# Patient Record
Sex: MALE | URBAN - NONMETROPOLITAN AREA
[De-identification: names, ages, dates, MRNs, and addresses within clinical notes are randomized per-mention and may not be internally consistent; named-entity substitution may affect disease eponyms.]

---

## 2020-02-19 ENCOUNTER — DOCTOR'S OFFICE (OUTPATIENT)
Dept: URBAN - NONMETROPOLITAN AREA CLINIC 1 | Facility: CLINIC | Age: 50
Setting detail: OPHTHALMOLOGY
End: 2020-02-19
Payer: COMMERCIAL

## 2020-02-19 DIAGNOSIS — H25.13: ICD-10-CM

## 2020-02-19 PROCEDURE — 99204 OFFICE O/P NEW MOD 45 MIN: CPT | Performed by: OPHTHALMOLOGY

## 2020-02-19 ASSESSMENT — REFRACTION_AUTOREFRACTION
OS_CYLINDER: -0.25
OS_AXIS: 062
OD_AXIS: 153
OD_CYLINDER: -0.50
OS_SPHERE: +0.25
OD_SPHERE: -0.25

## 2020-02-19 ASSESSMENT — CONFRONTATIONAL VISUAL FIELD TEST (CVF)
OD_FINDINGS: FULL
OS_FINDINGS: FULL

## 2020-02-19 ASSESSMENT — SPHEQUIV_DERIVED
OS_SPHEQUIV: 0.125
OD_SPHEQUIV: -0.5

## 2020-02-19 ASSESSMENT — VISUAL ACUITY
OD_BCVA: 20/20-2
OS_BCVA: 20/40+2

## 2020-03-11 ENCOUNTER — DOCTOR'S OFFICE (OUTPATIENT)
Dept: URBAN - NONMETROPOLITAN AREA CLINIC 1 | Facility: CLINIC | Age: 50
Setting detail: OPHTHALMOLOGY
End: 2020-03-11

## 2020-03-11 DIAGNOSIS — H52.223: ICD-10-CM

## 2020-03-11 DIAGNOSIS — H52.4: ICD-10-CM

## 2020-03-11 PROCEDURE — 92015 DETERMINE REFRACTIVE STATE: CPT | Performed by: OPTOMETRIST

## 2020-03-11 ASSESSMENT — REFRACTION_MANIFEST
OS_VA2: 20/25
OD_SPHERE: -0.25
OS_SPHERE: PLANO
OD_ADD: +2.25
OD_VA1: 20/25-2
OD_VA2: 20/25-2
OD_AXIS: 165
OS_AXIS: 030
OS_ADD: +2.25
OD_CYLINDER: -0.25
OS_VA1: 20/25
OS_CYLINDER: -0.25

## 2020-03-11 ASSESSMENT — SPHEQUIV_DERIVED
OD_SPHEQUIV: -0.375
OS_SPHEQUIV: 0
OD_SPHEQUIV: -0.5

## 2020-03-11 ASSESSMENT — VISUAL ACUITY
OS_BCVA: 20/50-2
OD_BCVA: 20/20-2

## 2020-03-11 ASSESSMENT — REFRACTION_AUTOREFRACTION
OD_CYLINDER: -0.50
OS_SPHERE: +0.25
OD_SPHERE: -0.25
OS_AXIS: 033
OD_AXIS: 164
OS_CYLINDER: -0.50

## 2020-03-11 ASSESSMENT — CONFRONTATIONAL VISUAL FIELD TEST (CVF)
OD_FINDINGS: FULL
OS_FINDINGS: FULL

## 2022-02-15 ENCOUNTER — DOCTOR'S OFFICE (OUTPATIENT)
Dept: URBAN - NONMETROPOLITAN AREA CLINIC 1 | Facility: CLINIC | Age: 52
Setting detail: OPHTHALMOLOGY
End: 2022-02-15
Payer: COMMERCIAL

## 2022-02-15 DIAGNOSIS — H35.373: ICD-10-CM

## 2022-02-15 DIAGNOSIS — H35.3121: ICD-10-CM

## 2022-02-15 DIAGNOSIS — H25.13: ICD-10-CM

## 2022-02-15 DIAGNOSIS — H25.013: ICD-10-CM

## 2022-02-15 PROBLEM — H25.11 CATARACT NUCLEAR SCLEROSIS AGE RELATED; RIGHT EYE, LEFT EYE: Status: ACTIVE | Noted: 2020-02-19

## 2022-02-15 PROBLEM — H25.12 CATARACT NUCLEAR SCLEROSIS AGE RELATED; RIGHT EYE, LEFT EYE: Status: ACTIVE | Noted: 2020-02-19

## 2022-02-15 PROBLEM — H52.223 ASTIGMATISM, REGULAR; BOTH EYES: Status: ACTIVE | Noted: 2020-03-11

## 2022-02-15 PROCEDURE — 99214 OFFICE O/P EST MOD 30 MIN: CPT | Performed by: OPHTHALMOLOGY

## 2022-02-15 PROCEDURE — 92134 CPTRZ OPH DX IMG PST SGM RTA: CPT | Performed by: OPHTHALMOLOGY

## 2022-02-15 ASSESSMENT — SPHEQUIV_DERIVED
OD_SPHEQUIV: -0.125
OD_SPHEQUIV: -0.375
OS_SPHEQUIV: -0.5

## 2022-02-15 ASSESSMENT — REFRACTION_MANIFEST
OD_CYLINDER: -0.25
OD_ADD: +2.25
OD_VA2: 20/25-2
OD_VA1: 20/25-2
OS_AXIS: 030
OD_SPHERE: -0.25
OD_AXIS: 165
OS_VA2: 20/25
OS_CYLINDER: -0.25
OS_SPHERE: PLANO
OS_VA1: 20/25
OS_ADD: +2.25

## 2022-02-15 ASSESSMENT — REFRACTION_AUTOREFRACTION
OS_AXIS: 064
OD_AXIS: 163
OD_CYLINDER: -0.75
OS_SPHERE: 0.00
OS_CYLINDER: -1.00
OD_SPHERE: +0.25

## 2022-02-15 ASSESSMENT — CONFRONTATIONAL VISUAL FIELD TEST (CVF)
OS_FINDINGS: FULL
OD_FINDINGS: FULL

## 2022-02-15 ASSESSMENT — VISUAL ACUITY
OS_BCVA: 20/50+1
OD_BCVA: 20/25

## 2022-07-30 ENCOUNTER — APPOINTMENT (EMERGENCY)
Dept: RADIOLOGY | Facility: HOSPITAL | Age: 52
End: 2022-07-30
Payer: COMMERCIAL

## 2022-07-30 ENCOUNTER — HOSPITAL ENCOUNTER (EMERGENCY)
Facility: HOSPITAL | Age: 52
Discharge: HOME | End: 2022-07-30
Attending: EMERGENCY MEDICINE | Admitting: EMERGENCY MEDICINE
Payer: COMMERCIAL

## 2022-07-30 VITALS
TEMPERATURE: 97.2 F | RESPIRATION RATE: 18 BRPM | SYSTOLIC BLOOD PRESSURE: 104 MMHG | HEART RATE: 88 BPM | DIASTOLIC BLOOD PRESSURE: 53 MMHG | WEIGHT: 281.31 LBS | OXYGEN SATURATION: 93 % | BODY MASS INDEX: 45.21 KG/M2 | HEIGHT: 66 IN

## 2022-07-30 DIAGNOSIS — G89.29 CHRONIC PAIN OF LEFT LOWER EXTREMITY: Primary | ICD-10-CM

## 2022-07-30 DIAGNOSIS — M79.605 CHRONIC PAIN OF LEFT LOWER EXTREMITY: Primary | ICD-10-CM

## 2022-07-30 LAB
ANION GAP SERPL CALCULATED.3IONS-SCNC: 12 MMOL/L (ref 4–13)
APTT PPP: 25 SECONDS (ref 23–37)
BASOPHILS # BLD AUTO: 0.03 THOUSANDS/ΜL (ref 0–0.1)
BASOPHILS NFR BLD AUTO: 0 % (ref 0–1)
BUN SERPL-MCNC: 22 MG/DL (ref 5–25)
CALCIUM SERPL-MCNC: 9 MG/DL (ref 8.3–10.1)
CARDIAC TROPONIN I PNL SERPL HS: 4 NG/L
CHLORIDE SERPL-SCNC: 100 MMOL/L (ref 96–108)
CO2 SERPL-SCNC: 26 MMOL/L (ref 21–32)
CREAT SERPL-MCNC: 1.17 MG/DL (ref 0.6–1.3)
D DIMER PPP FEU-MCNC: 0.38 UG/ML FEU
EOSINOPHIL # BLD AUTO: 0.3 THOUSAND/ΜL (ref 0–0.61)
EOSINOPHIL NFR BLD AUTO: 3 % (ref 0–6)
ERYTHROCYTE [DISTWIDTH] IN BLOOD BY AUTOMATED COUNT: 12.6 % (ref 11.6–15.1)
GFR SERPL CREATININE-BSD FRML MDRD: 71 ML/MIN/1.73SQ M
GLUCOSE SERPL-MCNC: 101 MG/DL (ref 65–140)
HCT VFR BLD AUTO: 49.6 % (ref 36.5–49.3)
HGB BLD-MCNC: 17.1 G/DL (ref 12–17)
IMM GRANULOCYTES # BLD AUTO: 0.04 THOUSAND/UL (ref 0–0.2)
IMM GRANULOCYTES NFR BLD AUTO: 0 % (ref 0–2)
INR PPP: 0.97 (ref 0.84–1.19)
LACTATE SERPL-SCNC: 1 MMOL/L (ref 0.5–2)
LYMPHOCYTES # BLD AUTO: 2.47 THOUSANDS/ΜL (ref 0.6–4.47)
LYMPHOCYTES NFR BLD AUTO: 27 % (ref 14–44)
MCH RBC QN AUTO: 31 PG (ref 26.8–34.3)
MCHC RBC AUTO-ENTMCNC: 34.5 G/DL (ref 31.4–37.4)
MCV RBC AUTO: 90 FL (ref 82–98)
MONOCYTES # BLD AUTO: 0.72 THOUSAND/ΜL (ref 0.17–1.22)
MONOCYTES NFR BLD AUTO: 8 % (ref 4–12)
NEUTROPHILS # BLD AUTO: 5.51 THOUSANDS/ΜL (ref 1.85–7.62)
NEUTS SEG NFR BLD AUTO: 62 % (ref 43–75)
NRBC BLD AUTO-RTO: 0 /100 WBCS
NT-PROBNP SERPL-MCNC: 32 PG/ML
PLATELET # BLD AUTO: 269 THOUSANDS/UL (ref 149–390)
PMV BLD AUTO: 9.2 FL (ref 8.9–12.7)
POTASSIUM SERPL-SCNC: 3.1 MMOL/L (ref 3.5–5.3)
PROTHROMBIN TIME: 13 SECONDS (ref 11.6–14.5)
RBC # BLD AUTO: 5.52 MILLION/UL (ref 3.88–5.62)
SODIUM SERPL-SCNC: 138 MMOL/L (ref 135–147)
WBC # BLD AUTO: 9.07 THOUSAND/UL (ref 4.31–10.16)

## 2022-07-30 PROCEDURE — 73590 X-RAY EXAM OF LOWER LEG: CPT

## 2022-07-30 PROCEDURE — 96361 HYDRATE IV INFUSION ADD-ON: CPT

## 2022-07-30 PROCEDURE — 93005 ELECTROCARDIOGRAM TRACING: CPT

## 2022-07-30 PROCEDURE — 83605 ASSAY OF LACTIC ACID: CPT | Performed by: EMERGENCY MEDICINE

## 2022-07-30 PROCEDURE — 83880 ASSAY OF NATRIURETIC PEPTIDE: CPT | Performed by: EMERGENCY MEDICINE

## 2022-07-30 PROCEDURE — 85730 THROMBOPLASTIN TIME PARTIAL: CPT | Performed by: EMERGENCY MEDICINE

## 2022-07-30 PROCEDURE — 84484 ASSAY OF TROPONIN QUANT: CPT | Performed by: EMERGENCY MEDICINE

## 2022-07-30 PROCEDURE — 36415 COLL VENOUS BLD VENIPUNCTURE: CPT | Performed by: EMERGENCY MEDICINE

## 2022-07-30 PROCEDURE — 73502 X-RAY EXAM HIP UNI 2-3 VIEWS: CPT

## 2022-07-30 PROCEDURE — 73562 X-RAY EXAM OF KNEE 3: CPT

## 2022-07-30 PROCEDURE — 73552 X-RAY EXAM OF FEMUR 2/>: CPT

## 2022-07-30 PROCEDURE — 99285 EMERGENCY DEPT VISIT HI MDM: CPT | Performed by: EMERGENCY MEDICINE

## 2022-07-30 PROCEDURE — 99284 EMERGENCY DEPT VISIT MOD MDM: CPT

## 2022-07-30 PROCEDURE — 96374 THER/PROPH/DIAG INJ IV PUSH: CPT

## 2022-07-30 PROCEDURE — 85379 FIBRIN DEGRADATION QUANT: CPT | Performed by: EMERGENCY MEDICINE

## 2022-07-30 PROCEDURE — 80048 BASIC METABOLIC PNL TOTAL CA: CPT | Performed by: EMERGENCY MEDICINE

## 2022-07-30 PROCEDURE — 85025 COMPLETE CBC W/AUTO DIFF WBC: CPT | Performed by: EMERGENCY MEDICINE

## 2022-07-30 PROCEDURE — 85610 PROTHROMBIN TIME: CPT | Performed by: EMERGENCY MEDICINE

## 2022-07-30 RX ORDER — KETOROLAC TROMETHAMINE 30 MG/ML
10 INJECTION, SOLUTION INTRAMUSCULAR; INTRAVENOUS ONCE
Status: COMPLETED | OUTPATIENT
Start: 2022-07-30 | End: 2022-07-30

## 2022-07-30 RX ORDER — HYDROCHLOROTHIAZIDE 12.5 MG/1
1 TABLET ORAL 2 TIMES DAILY
COMMUNITY
Start: 2022-07-18

## 2022-07-30 RX ORDER — LOSARTAN POTASSIUM 100 MG/1
1 TABLET ORAL DAILY
COMMUNITY
Start: 2022-07-25

## 2022-07-30 RX ORDER — MORPHINE SULFATE 15 MG/1
15 TABLET ORAL EVERY 8 HOURS PRN
Qty: 4 TABLET | Refills: 0 | Status: SHIPPED | OUTPATIENT
Start: 2022-07-30

## 2022-07-30 RX ORDER — MELOXICAM 7.5 MG/1
7.5 TABLET ORAL DAILY
COMMUNITY
Start: 2022-07-29 | End: 2023-07-29

## 2022-07-30 RX ORDER — POTASSIUM CHLORIDE 20 MEQ/1
40 TABLET, EXTENDED RELEASE ORAL ONCE
Status: COMPLETED | OUTPATIENT
Start: 2022-07-30 | End: 2022-07-30

## 2022-07-30 RX ADMIN — SODIUM CHLORIDE 500 ML: 0.9 INJECTION, SOLUTION INTRAVENOUS at 09:29

## 2022-07-30 RX ADMIN — POTASSIUM CHLORIDE 40 MEQ: 1500 TABLET, EXTENDED RELEASE ORAL at 11:03

## 2022-07-30 RX ADMIN — KETOROLAC TROMETHAMINE 9.9 MG: 30 INJECTION, SOLUTION INTRAMUSCULAR at 09:34
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
## 2022-08-01 LAB
ATRIAL RATE: 104 BPM
P AXIS: 43 DEGREES
PR INTERVAL: 144 MS
QRS AXIS: 69 DEGREES
QRSD INTERVAL: 84 MS
QT INTERVAL: 346 MS
QTC INTERVAL: 454 MS
T WAVE AXIS: -11 DEGREES
VENTRICULAR RATE: 104 BPM

## 2022-08-01 PROCEDURE — 93010 ELECTROCARDIOGRAM REPORT: CPT | Performed by: INTERNAL MEDICINE

## 2022-11-21 ENCOUNTER — EVALUATION (OUTPATIENT)
Dept: PHYSICAL THERAPY | Facility: CLINIC | Age: 52
End: 2022-11-21

## 2022-11-21 DIAGNOSIS — M51.27 HERNIATION OF INTERVERTEBRAL DISC BETWEEN L5 AND S1: ICD-10-CM

## 2022-11-21 DIAGNOSIS — M51.26 HERNIATION OF INTERVERTEBRAL DISC BETWEEN L4 AND L5: Primary | ICD-10-CM

## 2022-11-21 NOTE — PROGRESS NOTES
PT Evaluation     Today's date: 2022  Patient name: Jose Meyers  : 1970  MRN: [id number]  Referring provider: Cindy Matta MD  Dx:   Encounter Diagnosis     ICD-10-CM    1  Herniation of intervertebral disc between L4 and L5  M51 26       2  Herniation of intervertebral disc between L5 and S1  M51 27                      Assessment  Assessment details: Pt is a 46year old male who presents to OP PT for intervertebral disc herniation of L4-L5 and L5-S1  He reports pain into his L lower leg about 3 months ago, progressing to his entire LE  As of today, he notes some improvements in his pain and states that his pain is present in his L upper leg to his knee  Upon examination, patient presents with (+) L YENNY, WFL lumbar ROM, WFL LE strength, and no directional preference  Due to his current impairments patient has difficulty with prolonged sitting and performing work related activities  Pt would benefit from OP PT services in order to address current impairments and functional limitations  Thank you for your referral!    Impairments: abnormal gait, abnormal or restricted ROM, activity intolerance, impaired physical strength, lacks appropriate home exercise program and pain with function    Goals  STG (to be met within 4 weeks):  1  Pt will reports no more than 3/10 pain at worst in order to improve function   2  Pt will improve lumbar ROM to next least restrictive motion in order to improve lumbar mobility  3  Pt will be able to tolerate sitting for at least 30 minutes in order to complete basic work related tasks  4  Pt will be able to ambulate work related distances without increase in pain in order to improve function  5  Pt will improve FOTO score by at least 10 points in order to improve QOL    LTG (to be met in 8 weeks):  1  Pt will report no more than 0/10 pain at worst in order to complete ADLs  2  Pt will be able to sit for self selected periods of time in order to improve function  3  Pt will be able to ambulate self selected distances in order to meet personal goals  4  Pt will to meet FOTO discharge score in order to improve QOL  5  Pt to be independent with HEP    Plan  Patient would benefit from: skilled physical therapy  Planned modality interventions: unattended electrical stimulation, thermotherapy: hydrocollator packs and cryotherapy  Planned therapy interventions: joint mobilization, manual therapy, neuromuscular re-education, patient education, strengthening, stretching, therapeutic exercise, home exercise program and balance  Frequency: 1x week  Duration in weeks: 6  Treatment plan discussed with: patient        Subjective Evaluation    History of Present Illness  Mechanism of injury: Pt notes lumbar spine pain for the past 3 months ago  He reports it started as pain in his L lower leg at night, then continued throughout his whole LLE  He was seen in ED and seen by PCP, told it was from his back  Standing or walking improves his pain, sitting is when he gets most of his pain  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  Overall pt notes that his pain is starting to get a little better    Pain  Current pain ratin  At best pain ratin  At worst pain ratin  Location: L anterior thigh  Quality: sharp  Aggravating factors: sitting    Treatments  Current treatment: physical therapy  Patient Goals  Patient goals for therapy: increased strength, independence with ADLs/IADLs, increased motion, improved balance and decreased pain          Objective     Concurrent Complaints  Negative for bladder dysfunction, bowel dysfunction and saddle (S4) numbness    Palpation     Additional Palpation Details  (-) tenderness    Tenderness     Additional Tenderness Details  No tenderness noted    Neurological Testing     Sensation     Lumbar   Left   Intact: light touch    Right   Intact: light touch    Reflexes   Left   Patellar (L4): normal (2+)  Achilles (S1): normal (2+)    Right   Patellar (L4): normal (2+)  Achilles (S1): normal (2+)    Active Range of Motion     Lumbar   Flexion:  WFL  Extension:  Restriction level: moderate  Left lateral flexion:  WFL  Right lateral flexion:  [facility]    Joint Play   Joints within functional limits: L2, L3, L4, L5 and S1   Mechanical Assessment    Cervical      Thoracic      Lumbar    Standing flexion: repeated movements   Pain location:no change  Standing extension: repeated movements  Pain location: no change    Strength/Myotome Testing     Left Hip   Planes of Motion   Flexion: 4-  Abduction: 4-    Right Hip   Planes of Motion   Flexion: 4  Abduction: 4-    Left Knee   Flexion: 4  Extension: 4    Right Knee   Flexion: 4  Extension: 4    Tests     Lumbar     Left   Negative crossed SLR, passive SLR, sural bias and tibial bias  Right   Negative crossed SLR and passive SLR  Left Hip   Positive YENNY  Right Hip   Negative YENNY  Left Knee   Negative peroneal nerve tension         Flowsheet Rows    Flowsheet Row Most Recent Value   PT/OT G-Codes    Current Score 44   Projected Score 64             Precautions: HTN    Manuals 11/21       LE HS, quad, piriformis        Long axis distraction                        Neuro Re-Ed        Prone hip /        Palloff press                        TherEx        TM        Standing lumbar /        PPT        LTR        Selam Dunn                        Instructed HEP & education 10'                       Modalities         MHP/CP PRN

## 2022-11-21 NOTE — LETTER
2022    Tari Hernandez MD  [facility]  [facility] [id number]    Patient: Wilford Kehr   YOB: 1970   Date of Visit: 2022     Encounter Diagnosis     ICD-10-CM    1  Herniation of intervertebral disc between L4 and L5  M51 26       2  Herniation of intervertebral disc between L5 and S1  M51 27           Dear Dr Susanna Headley:    Thank you for your recent referral of Wilford Kehr  Please review the attached evaluation summary from Sonny's recent visit  Please verify that you agree with the plan of care by signing the attached order  If you have any questions or concerns, please do not hesitate to call  I sincerely appreciate the opportunity to share in the care of one of your patients and hope to have another opportunity to work with you in the near future  Sincerely,    Nereida Basurto PT      Referring Provider:      I certify that I have read the below Plan of Care and certify the need for these services furnished under this plan of treatment while under my care  Tari Hernandez MD  [facility]  [address] [place] [address]  Via Fax: [phone]          PT Evaluation     Today's date: 2022  Patient name: Wilford Kehr  : 1970  MRN: [id number]  Referring provider: Kathie Coello MD  Dx:   Encounter Diagnosis     ICD-10-CM    1  Herniation of intervertebral disc between L4 and L5  M51 26       2  Herniation of intervertebral disc between L5 and S1  M51 27                      Assessment  Assessment details: Pt is a 46year old male who presents to OP PT for intervertebral disc herniation of L4-L5 and L5-S1  He reports pain into his L lower leg about 3 months ago, progressing to his entire LE  As of today, he notes some improvements in his pain and states that his pain is present in his L upper leg to his knee   Upon examination, patient presents with (+) L YENNY, WFL lumbar ROM, WFL LE strength, and no directional preference  Due to his current impairments patient has difficulty with prolonged sitting and performing work related activities  Pt would benefit from OP PT services in order to address current impairments and functional limitations  Thank you for your referral!    Impairments: abnormal gait, abnormal or restricted ROM, activity intolerance, impaired physical strength, lacks appropriate home exercise program and pain with function    Goals  STG (to be met within 4 weeks):  1  Pt will reports no more than 3/10 pain at worst in order to improve function   2  Pt will improve lumbar ROM to next least restrictive motion in order to improve lumbar mobility  3  Pt will be able to tolerate sitting for at least 30 minutes in order to complete basic work related tasks  4  Pt will be able to ambulate work related distances without increase in pain in order to improve function  5  Pt will improve FOTO score by at least 10 points in order to improve QOL    LTG (to be met in 8 weeks):  1  Pt will report no more than 0/10 pain at worst in order to complete ADLs  2  Pt will be able to sit for self selected periods of time in order to improve function  3  Pt will be able to ambulate self selected distances in order to meet personal goals  4  Pt will to meet FOTO discharge score in order to improve QOL  5  Pt to be independent with HEP    Plan  Patient would benefit from: skilled physical therapy  Planned modality interventions: unattended electrical stimulation, thermotherapy: hydrocollator packs and cryotherapy  Planned therapy interventions: joint mobilization, manual therapy, neuromuscular re-education, patient education, strengthening, stretching, therapeutic exercise, home exercise program and balance  Frequency: 1x week  Duration in weeks: 6  Treatment plan discussed with: patient        Subjective Evaluation    History of Present Illness  Mechanism of injury: Pt notes lumbar spine pain for the past 3 months ago  He reports it started as pain in his L lower leg at night, then continued throughout his whole LLE  He was seen in ED and seen by PCP, told it was from his back  Standing or walking improves his pain, sitting is when he gets most of his pain  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  Overall pt notes that his pain is starting to get a little better    Pain  Current pain ratin  At best pain ratin  At worst pain ratin  Location: L anterior thigh  Quality: sharp  Aggravating factors: sitting    Treatments  Current treatment: physical therapy  Patient Goals  Patient goals for therapy: increased strength, independence with ADLs/IADLs, increased motion, improved balance and decreased pain          Objective     Concurrent Complaints  Negative for bladder dysfunction, bowel dysfunction and saddle (S4) numbness    Palpation     Additional Palpation Details  (-) tenderness    Tenderness     Additional Tenderness Details  No tenderness noted    Neurological Testing     Sensation     Lumbar   Left   Intact: light touch    Right   Intact: light touch    Reflexes   Left   Patellar (L4): normal (2+)  Achilles (S1): normal (2+)    Right   Patellar (L4): normal (2+)  Achilles (S1): normal (2+)    Active Range of Motion     Lumbar   Flexion:  WFL  Extension:  Restriction level: moderate  Left lateral flexion:  WFL  Right lateral flexion:  [facility]    Joint Play   Joints within functional limits: L2, L3, L4, L5 and S1   Mechanical Assessment    Cervical      Thoracic      Lumbar    Standing flexion: repeated movements   Pain location:no change  Standing extension: repeated movements  Pain location: no change    Strength/Myotome Testing     Left Hip   Planes of Motion   Flexion: 4-  Abduction: 4-    Right Hip   Planes of Motion   Flexion: 4  Abduction: 4-    Left Knee   Flexion: 4  Extension: 4    Right Knee   Flexion: 4  Extension: 4    Tests     Lumbar     Left   Negative crossed SLR, passive SLR, sural bias and tibial bias  Right   Negative crossed SLR and passive SLR  Left Hip   Positive YENNY  Right Hip   Negative YENNY  Left Knee   Negative peroneal nerve tension         Flowsheet Rows    Flowsheet Row Most Recent Value   PT/OT G-Codes    Current Score 44   Projected Score 64            Precautions: HTN    Manuals 11/21       LE HS, quad, piriformis        Long axis distraction                        Neuro Re-Ed        Prone hip /        Palloff press                        TherEx        TM        Standing lumbar /        PPT        LTR        Bridges        Dante Dunn                        Instructed HEP & education 10'                       Modalities         MHP/CP PRN

## 2022-11-28 ENCOUNTER — OFFICE VISIT (OUTPATIENT)
Dept: PHYSICAL THERAPY | Facility: CLINIC | Age: 52
End: 2022-11-28

## 2022-11-28 DIAGNOSIS — M51.26 HERNIATION OF INTERVERTEBRAL DISC BETWEEN L4 AND L5: Primary | ICD-10-CM

## 2022-11-28 DIAGNOSIS — M51.27 HERNIATION OF INTERVERTEBRAL DISC BETWEEN L5 AND S1: ICD-10-CM

## 2022-11-28 NOTE — PROGRESS NOTES
Daily Note     Today's date: 2022  Patient name: Jose Meyers  : 1970  MRN: [id number]  Referring provider: Cindy Matta MD  Dx:   Encounter Diagnosis     ICD-10-CM    1  Herniation of intervertebral disc between L4 and L5  M51 26       2  Herniation of intervertebral disc between L5 and S1  M51 27                      Subjective: Patient reports feeling some relief of L hip tightness post tx and therapeutic ex today  "The pain in my L hip is gone since you stretched my leg "      Objective: See treatment diary below      Assessment: Tolerated treatment well  Patient exhibited good technique with therapeutic exercises and would benefit from continued PT to increase ROM/strength and endurance to improve mobility  Plan: Continue per plan of care        Precautions: HTN    Manuals       LE HS, quad, piriformis  10'      Long axis distraction  5'                      Neuro Re-Ed        Prone hip /  10x bilat      Palloff press                        TherEx        TM  10'      Standing lumbar /  10x5"      PPT  15x5"      LTR        Bridges  2x10      Clamshells  2x10      Reverse Clamshells  2x10      Lunges                        Instructed HEP & education 10'                       Modalities         MHP/CP PRN

## 2024-12-22 ENCOUNTER — HOSPITAL ENCOUNTER (EMERGENCY)
Facility: HOSPITAL | Age: 54
Discharge: HOME | End: 2024-12-23
Attending: EMERGENCY MEDICINE
Payer: COMMERCIAL

## 2024-12-22 ENCOUNTER — APPOINTMENT (EMERGENCY)
Dept: RADIOLOGY | Facility: HOSPITAL | Age: 54
End: 2024-12-22
Payer: COMMERCIAL

## 2024-12-22 DIAGNOSIS — J11.1 INFLUENZA: ICD-10-CM

## 2024-12-22 DIAGNOSIS — J18.9 PNEUMONIA: Primary | ICD-10-CM

## 2024-12-22 LAB
ALBUMIN SERPL BCG-MCNC: 4.1 G/DL (ref 3.5–5)
ALP SERPL-CCNC: 79 U/L (ref 34–104)
ALT SERPL W P-5'-P-CCNC: 44 U/L (ref 7–52)
ANION GAP SERPL CALCULATED.3IONS-SCNC: 9 MMOL/L (ref 4–13)
AST SERPL W P-5'-P-CCNC: 25 U/L (ref 13–39)
BASOPHILS # BLD AUTO: 0.04 THOUSANDS/ÂΜL (ref 0–0.1)
BASOPHILS NFR BLD AUTO: 0 % (ref 0–1)
BILIRUB SERPL-MCNC: 0.74 MG/DL (ref 0.2–1)
BNP SERPL-MCNC: 26 PG/ML (ref 0–100)
BUN SERPL-MCNC: 19 MG/DL (ref 5–25)
CALCIUM SERPL-MCNC: 9.1 MG/DL (ref 8.4–10.2)
CHLORIDE SERPL-SCNC: 105 MMOL/L (ref 96–108)
CO2 SERPL-SCNC: 27 MMOL/L (ref 21–32)
CREAT SERPL-MCNC: 1.11 MG/DL (ref 0.6–1.3)
EOSINOPHIL # BLD AUTO: 0.34 THOUSAND/ÂΜL (ref 0–0.61)
EOSINOPHIL NFR BLD AUTO: 4 % (ref 0–6)
ERYTHROCYTE [DISTWIDTH] IN BLOOD BY AUTOMATED COUNT: 12.5 % (ref 11.6–15.1)
FLUAV AG UPPER RESP QL IA.RAPID: NEGATIVE
FLUBV AG UPPER RESP QL IA.RAPID: POSITIVE
GFR SERPL CREATININE-BSD FRML MDRD: 74 ML/MIN/1.73SQ M
GLUCOSE SERPL-MCNC: 94 MG/DL (ref 65–140)
HCT VFR BLD AUTO: 48.2 % (ref 36.5–49.3)
HGB BLD-MCNC: 16.2 G/DL (ref 12–17)
IMM GRANULOCYTES # BLD AUTO: 0.03 THOUSAND/UL (ref 0–0.2)
IMM GRANULOCYTES NFR BLD AUTO: 0 % (ref 0–2)
LYMPHOCYTES # BLD AUTO: 3.79 THOUSANDS/ÂΜL (ref 0.6–4.47)
LYMPHOCYTES NFR BLD AUTO: 40 % (ref 14–44)
MCH RBC QN AUTO: 31 PG (ref 26.8–34.3)
MCHC RBC AUTO-ENTMCNC: 33.6 G/DL (ref 31.4–37.4)
MCV RBC AUTO: 92 FL (ref 82–98)
MONOCYTES # BLD AUTO: 0.9 THOUSAND/ÂΜL (ref 0.17–1.22)
MONOCYTES NFR BLD AUTO: 9 % (ref 4–12)
NEUTROPHILS # BLD AUTO: 4.44 THOUSANDS/ÂΜL (ref 1.85–7.62)
NEUTS SEG NFR BLD AUTO: 47 % (ref 43–75)
NRBC BLD AUTO-RTO: 0 /100 WBCS
PLATELET # BLD AUTO: 272 THOUSANDS/UL (ref 149–390)
PMV BLD AUTO: 9.6 FL (ref 8.9–12.7)
POTASSIUM SERPL-SCNC: 3.8 MMOL/L (ref 3.5–5.3)
PROCALCITONIN SERPL-MCNC: <0.05 NG/ML
PROT SERPL-MCNC: 7.5 G/DL (ref 6.4–8.4)
RBC # BLD AUTO: 5.22 MILLION/UL (ref 3.88–5.62)
SARS-COV+SARS-COV-2 AG RESP QL IA.RAPID: NEGATIVE
SODIUM SERPL-SCNC: 141 MMOL/L (ref 135–147)
WBC # BLD AUTO: 9.54 THOUSAND/UL (ref 4.31–10.16)

## 2024-12-22 PROCEDURE — 84145 PROCALCITONIN (PCT): CPT | Performed by: EMERGENCY MEDICINE

## 2024-12-22 PROCEDURE — 71046 X-RAY EXAM CHEST 2 VIEWS: CPT

## 2024-12-22 PROCEDURE — 99285 EMERGENCY DEPT VISIT HI MDM: CPT

## 2024-12-22 PROCEDURE — 85025 COMPLETE CBC W/AUTO DIFF WBC: CPT | Performed by: EMERGENCY MEDICINE

## 2024-12-22 PROCEDURE — 93005 ELECTROCARDIOGRAM TRACING: CPT

## 2024-12-22 PROCEDURE — 87811 SARS-COV-2 COVID19 W/OPTIC: CPT | Performed by: EMERGENCY MEDICINE

## 2024-12-22 PROCEDURE — 80053 COMPREHEN METABOLIC PANEL: CPT | Performed by: EMERGENCY MEDICINE

## 2024-12-22 PROCEDURE — 83880 ASSAY OF NATRIURETIC PEPTIDE: CPT | Performed by: EMERGENCY MEDICINE

## 2024-12-22 PROCEDURE — 87804 INFLUENZA ASSAY W/OPTIC: CPT | Performed by: EMERGENCY MEDICINE

## 2024-12-22 PROCEDURE — 36415 COLL VENOUS BLD VENIPUNCTURE: CPT | Performed by: EMERGENCY MEDICINE

## 2024-12-23 VITALS
TEMPERATURE: 98.1 F | RESPIRATION RATE: 20 BRPM | OXYGEN SATURATION: 94 % | DIASTOLIC BLOOD PRESSURE: 64 MMHG | HEART RATE: 81 BPM | SYSTOLIC BLOOD PRESSURE: 124 MMHG

## 2024-12-23 LAB
QRS AXIS: 12 DEGREES
QRSD INTERVAL: 80 MS
QT INTERVAL: 376 MS
QTC INTERVAL: 428 MS
T WAVE AXIS: 8 DEGREES
VENTRICULAR RATE: 78 BPM

## 2024-12-23 PROCEDURE — 99285 EMERGENCY DEPT VISIT HI MDM: CPT | Performed by: EMERGENCY MEDICINE

## 2024-12-23 PROCEDURE — 96374 THER/PROPH/DIAG INJ IV PUSH: CPT

## 2024-12-23 RX ORDER — OSELTAMIVIR PHOSPHATE 75 MG/1
75 CAPSULE ORAL 2 TIMES DAILY
Qty: 10 CAPSULE | Refills: 0 | Status: SHIPPED | OUTPATIENT
Start: 2024-12-23 | End: 2024-12-28

## 2024-12-23 RX ORDER — DEXAMETHASONE SODIUM PHOSPHATE 10 MG/ML
8 INJECTION, SOLUTION INTRAMUSCULAR; INTRAVENOUS ONCE
Status: COMPLETED | OUTPATIENT
Start: 2024-12-23 | End: 2024-12-23

## 2024-12-23 RX ORDER — OSELTAMIVIR PHOSPHATE 75 MG/1
75 CAPSULE ORAL ONCE
Status: COMPLETED | OUTPATIENT
Start: 2024-12-23 | End: 2024-12-23

## 2024-12-23 RX ORDER — PREDNISONE 20 MG/1
40 TABLET ORAL DAILY
Qty: 8 TABLET | Refills: 0 | Status: SHIPPED | OUTPATIENT
Start: 2024-12-24 | End: 2024-12-28

## 2024-12-23 RX ORDER — DOXYCYCLINE 100 MG/1
100 CAPSULE ORAL EVERY 12 HOURS SCHEDULED
Qty: 14 CAPSULE | Refills: 0 | Status: SHIPPED | OUTPATIENT
Start: 2024-12-23 | End: 2024-12-30

## 2024-12-23 RX ORDER — DOXYCYCLINE 100 MG/1
100 CAPSULE ORAL ONCE
Status: COMPLETED | OUTPATIENT
Start: 2024-12-23 | End: 2024-12-23

## 2024-12-23 RX ADMIN — OSELTAMAVIR PHOSPHATE 75 MG: 75 CAPSULE ORAL at 00:34

## 2024-12-23 RX ADMIN — DOXYCYCLINE 100 MG: 100 CAPSULE ORAL at 00:34

## 2024-12-23 RX ADMIN — DEXAMETHASONE SODIUM PHOSPHATE 8 MG: 10 INJECTION, SOLUTION INTRAMUSCULAR; INTRAVENOUS at 00:34

## 2024-12-23 NOTE — ED PROVIDER NOTES
Time reflects when diagnosis was documented in both MDM as applicable and the Disposition within this note       Time User Action Codes Description Comment    12/23/2024 12:28 AM Yun Suarez [J18.9] Pneumonia     12/23/2024 12:28 AM Yun Suarez [J11.1] Influenza           ED Disposition       ED Disposition   Discharge    Condition   Stable    Date/Time   Mon Dec 23, 2024 12:28 AM    Comment   Sonny Mckeon discharge to home/self care.                   Assessment & Plan       Medical Decision Making  Differential diagnosis includes but not limited to: Pneumonia, bronchitis, viral syndrome, COPD exacerbation, CHF exacerbation.    Amount and/or Complexity of Data Reviewed  Labs: ordered.  Radiology: ordered and independent interpretation performed.    Risk  Prescription drug management.        ED Course as of 12/23/24 0052   Mon Dec 23, 2024   0046 Patient stable during the ED course.  Found to be influenza positive.  Oxygenation on room air 95%.  Did ambulatory trial with pulse ox and patient did not have any decrease in his oxygenation and states that he felt improved.  Chest x-ray with questionable infiltrate.  Patient given Tamiflu as well as doxycycline and steroids.  Advised to return to the ER if symptoms worsen in any way.  I did strongly advised the patient to follow-up with his PCP regarding his cessation of his diuretic.  He agrees to comply.       Medications   oseltamivir (TAMIFLU) capsule 75 mg (75 mg Oral Given 12/23/24 0034)   doxycycline hyclate (VIBRAMYCIN) capsule 100 mg (100 mg Oral Given 12/23/24 0034)   dexamethasone (PF) (DECADRON) injection 8 mg (8 mg Intravenous Given 12/23/24 0034)       ED Risk Strat Scores                                              History of Present Illness       Chief Complaint   Patient presents with    Shortness of Breath     Pt was laying flat in bed and felt short of breath. Pt has no other complaints and feels fine during triage.        Past  Medical History:   Diagnosis Date    Arthritis     Hypertension       History reviewed. No pertinent surgical history.   History reviewed. No pertinent family history.   Social History     Tobacco Use    Smoking status: Never    Smokeless tobacco: Never   Vaping Use    Vaping status: Never Used   Substance Use Topics    Alcohol use: Not Currently    Drug use: Not Currently      E-Cigarette/Vaping    E-Cigarette Use Never User       E-Cigarette/Vaping Substances      I have reviewed and agree with the history as documented.     54-year-old male presenting to the ED for evaluation of 1 episode of shortness of breath.  Patient states that he was laying in bed when he began to feel short of breath.  He denies any chest pain and states that his symptoms improved upon arrival to the ED.  He denies any URI symptoms, recent travel or sick contacts.  Patient does admit that he was previously prescribed a water pill but stopped taking it secondary to his job.  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  He states that he has had no problems with his breathing or edema since stopping the medicine.        Review of Systems   Constitutional:  Negative for chills and fever.   HENT:  Negative for ear pain and sore throat.    Eyes:  Negative for pain and visual disturbance.   Respiratory:  Positive for shortness of breath. Negative for apnea, cough and choking.    Cardiovascular: Negative.  Negative for chest pain and palpitations.   Gastrointestinal:  Negative for abdominal pain and vomiting.   Genitourinary:  Negative for dysuria and hematuria.   Musculoskeletal:  Negative for arthralgias and back pain.   Skin:  Negative for color change and rash.   Neurological:  Negative for seizures and syncope.   All other systems reviewed and are negative.          Objective       ED Triage Vitals [12/22/24 2223]   Temperature Pulse Blood Pressure  Respirations SpO2 Patient Position - Orthostatic VS   98.1 °F (36.7 °C) 82 154/81 17 96 % Lying      Temp src Heart Rate Source BP Location FiO2 (%) Pain Score    -- Monitor Left arm -- --      Vitals      Date and Time Temp Pulse SpO2 Resp BP Pain Score FACES Pain Rating User   12/23/24 0015 -- 81 94 % 20 124/64 -- --    12/22/24 2300 -- 79 94 % 20 130/68 -- --    12/22/24 2223 98.1 °F (36.7 °C) 82 96 % 17 154/81 -- --             Physical Exam  Vitals and nursing note reviewed.   Constitutional:       General: He is not in acute distress.     Appearance: He is well-developed. He is obese. He is not ill-appearing, toxic-appearing or diaphoretic.   HENT:      Head: Normocephalic and atraumatic.      Mouth/Throat:      Mouth: Mucous membranes are moist.   Eyes:      Extraocular Movements: Extraocular movements intact.      Conjunctiva/sclera: Conjunctivae normal.   Cardiovascular:      Rate and Rhythm: Normal rate and regular rhythm.      Heart sounds: No murmur heard.  Pulmonary:      Effort: Pulmonary effort is normal. No tachypnea, accessory muscle usage or respiratory distress.      Breath sounds: Normal breath sounds. No decreased breath sounds, wheezing, rhonchi or rales.   Chest:      Chest wall: No mass, deformity, tenderness, crepitus or edema. There is no dullness to percussion.   Abdominal:      General: Bowel sounds are normal.      Palpations: Abdomen is soft.   Musculoskeletal:         General: No swelling. Normal range of motion.      Cervical back: Normal range of motion and neck supple.      Right lower leg: No edema.      Left lower leg: No edema.   Skin:     General: Skin is warm and dry.      Capillary Refill: Capillary refill takes less than 2 seconds.      Coloration: Skin is not cyanotic or pale.      Findings: No ecchymosis, erythema or rash.      Nails: There is no clubbing.   Neurological:      General: No focal deficit present.      Mental Status: He is alert and oriented to person,  place, and time.   Psychiatric:         Mood and Affect: Mood normal.         Behavior: Behavior normal.         Results Reviewed       Procedure Component Value Units Date/Time    Procalcitonin [[id number]]  (Normal) Collected: 12/22/24 2229    Lab Status: Final result Specimen: Blood from Arm, Left Updated: 12/22/24 2316     Procalcitonin <0.05 ng/ml     B-Type Natriuretic Peptide(BNP) [[id number]]  (Normal) Collected: 12/22/24 2229    Lab Status: Final result Specimen: Blood from Arm, Left Updated: 12/22/24 2312     BNP 26 pg/mL     Comprehensive metabolic panel [[id number]] Collected: 12/22/24 2229    Lab Status: Final result Specimen: Blood from Arm, Left Updated: 12/22/24 2307     Sodium 141 mmol/L      Potassium 3.8 mmol/L      Chloride 105 mmol/L      CO2 27 mmol/L      ANION GAP 9 mmol/L      BUN 19 mg/dL      Creatinine 1.11 mg/dL      Glucose 94 mg/dL      Calcium 9.1 mg/dL      AST 25 U/L      ALT 44 U/L      Alkaline Phosphatase 79 U/L      Total Protein 7.5 g/dL      Albumin 4.1 g/dL      Total Bilirubin 0.74 mg/dL      eGFR 74 ml/min/1.73sq m     Narrative:      National Kid[employer] guidelines for Chronic Kidney Disease (CKD):     Stage 1 with normal or high GFR (GFR > 90 mL/min/1.73 square meters)    Stage 2 Mild CKD (GFR = 60-89 mL/min/1.73 square meters)    Stage 3A Moderate CKD (GFR = 45-59 mL/min/1.73 square meters)    Stage 3B Moderate CKD (GFR = 30-44 mL/min/1.73 square meters)    Stage 4 Severe CKD (GFR = 15-29 mL/min/1.73 square meters)    Stage 5 End Stage CKD (GFR <15 mL/min/1.73 square meters)  Note: GFR calculation is accurate only with a steady state creatinine    FLU/COVID Rapid Antigen (30 min. TAT) - Preferred screening test in ED [[id number]]  (Abnormal) Collected: 12/22/24 2229    Lab Status: Final result Specimen: Nares from Nose Updated: 12/22/24 2307     SARS COV Rapid Antigen Negative     Influenza A Rapid Antigen Negative     Influenza B Rapid Antigen Positive     Narrative:      This test has been performed using the Quidel Miriam 2 FLU+SARS Antigen test under the Emergency Use Authorization (EUA). This test has been validated by the  and verified by the performing laboratory. The Miriam uses lateral flow immunofluorescent sandwich assay to detect SARS-COV, Influenza A and Influenza B Antigen.     The Quidel Miriam 2 SARS Antigen test does not differentiate between SARS-CoV and SARS-CoV-2.     Negative results are presumptive and may be confirmed with a molecular assay, if necessary, for patient management. Negative results do not rule out SARS-CoV-2 or influenza infection and should not be used as the sole basis for treatment or patient management decisions. A negative test result may occur if the level of antigen in a sample is below the limit of detection of this test.     Positive results are indicative of the presence of viral antigens, but do not rule out bacterial infection or co-infection with other viruses.     All test results should be used as an adjunct to clinical observations and other information available to the provider.    FOR PEDIATRIC PATIENTS - copy/paste COVID Guidelines URL to browser: [email or web address]    CBC and differential [[id number]] Collected: 12/22/24 2229    Lab Status: Final result Specimen: Blood from Arm, Left Updated: 12/22/24 2248     WBC 9.54 Thousand/uL      RBC 5.22 Million/uL      Hemoglobin 16.2 g/dL      Hematocrit 48.2 %      MCV 92 fL      MCH 31.0 pg      MCHC 33.6 g/dL      RDW 12.5 %      MPV 9.6 fL      Platelets 272 Thousands/uL      nRBC 0 /100 WBCs      Segmented % 47 %      Immature Grans % 0 %      Lymphocytes % 40 %      Monocytes % 9 %      Eosinophils Relative 4 %      Basophils Relative 0 %      Absolute Neutrophils 4.44 Thousands/µL      Absolute Immature Grans 0.03 Thousand/uL      Absolute Lymphocytes 3.79 Thousands/µL      Absolute Monocytes 0.90  Thousand/µL      Eosinophils Absolute 0.34 Thousand/µL      Basophils Absolute 0.04 Thousands/µL             XR chest 2 views   ED Interpretation by Yun Suarez DO (12/23 0029)   +inflltrate          ECG 12 Lead Documentation Only    Date/Time: 12/22/2024 10:43 PM    Performed by: Yun Suarez DO  Authorized by: Yun Suarez DO    Indications / Diagnosis:  Weakness  Patient location:  ED  Previous ECG:     Previous ECG:  Unavailable  Interpretation:     Interpretation: abnormal    Rate:     ECG rate:  78    ECG rate assessment: normal    Rhythm:     Rhythm: junctional    Ectopy:     Ectopy: none    QRS:     QRS axis:  Normal    QRS intervals:  Normal  Conduction:     Conduction: normal    ST segments:     ST segments:  Normal  T waves:     T waves: normal        ED Medication and Procedure Management   Prior to Admission Medications   Prescriptions Last Dose Informant Patient Reported? Taking?   hydrochlorothiazide (HYDRODIURIL) 12.5 mg tablet   Yes No   Sig: Take 1 tablet by mouth 2 (two) times a day   losartan (COZAAR) 100 MG tablet   Yes No   Sig: Take 1 tablet by mouth daily   meloxicam (MOBIC) 7.5 mg tablet   Yes No   Sig: Take 7.5 mg by mouth daily   morphine (MSIR) 15 mg tablet   No No   Sig: Take 1 tablet (15 mg total) by mouth every 8 (eight) hours as needed for severe pain for up to 4 doses Max Daily Amount: 45 mg      Facility-Administered Medications: None     Discharge Medication List as of 12/23/2024 12:31 AM        START taking these medications    Details   doxycycline hyclate (VIBRAMYCIN) 100 mg capsule Take 1 capsule (100 mg total) by mouth every 12 (twelve) hours for 7 days, Starting Mon 12/23/2024, Until Mon 12/30/2024, Normal      oseltamivir (TAMIFLU) 75 mg capsule Take 1 capsule (75 mg total) by mouth 2 (two) times a day for 5 days, Starting Mon 12/23/2024, Until Sat 12/28/2024, Normal      predniSONE 20 mg tablet Take 2 tablets (40 mg total) by mouth daily  for 4 days Do not start before December 24, 2024., Starting Tue 12/24/2024, Until Sat 12/28/2024, Normal           CONTINUE these medications which have NOT CHANGED    Details   hydrochlorothiazide (HYDRODIURIL) 12.5 mg tablet Take 1 tablet by mouth 2 (two) times a day, Starting Mon 7/18/2022, Historical Med      losartan (COZAAR) 100 MG tablet Take 1 tablet by mouth daily, Starting Mon 7/25/2022, Historical Med      meloxicam (MOBIC) 7.5 mg tablet Take 7.5 mg by mouth daily, Starting Fri 7/29/2022, Until Sat 7/29/2023, Historical Med      morphine (MSIR) 15 mg tablet Take 1 tablet (15 mg total) by mouth every 8 (eight) hours as needed for severe pain for up to 4 doses Max Daily Amount: 45 mg, Starting Sat 7/30/2022, Normal           No discharge procedures on file.  ED SEPSIS DOCUMENTATION   Time reflects when diagnosis was documented in both MDM as applicable and the Disposition within this note       Time User Action Codes Description Comment    12/23/2024 12:28 AM Yun Suarez [J18.9] Pneumonia     12/23/2024 12:28 AM Yun Suarez [J11.1] Influenza                  Yun Suarez, DO  12/23/24 0048       Yun Suarez, DO  12/23/24 0052

## 2024-12-28 ENCOUNTER — HOSPITAL ENCOUNTER (EMERGENCY)
Facility: HOSPITAL | Age: 54
Discharge: HOME | End: 2024-12-28
Attending: EMERGENCY MEDICINE
Payer: COMMERCIAL

## 2024-12-28 ENCOUNTER — APPOINTMENT (EMERGENCY)
Dept: CT IMAGING | Facility: HOSPITAL | Age: 54
End: 2024-12-28
Payer: COMMERCIAL

## 2024-12-28 VITALS
HEART RATE: 67 BPM | RESPIRATION RATE: 20 BRPM | DIASTOLIC BLOOD PRESSURE: 81 MMHG | TEMPERATURE: 97.6 F | SYSTOLIC BLOOD PRESSURE: 137 MMHG | OXYGEN SATURATION: 96 %

## 2024-12-28 DIAGNOSIS — R07.9 CHEST PAIN, UNSPECIFIED TYPE: ICD-10-CM

## 2024-12-28 DIAGNOSIS — R06.00 DYSPNEA, UNSPECIFIED TYPE: Primary | ICD-10-CM

## 2024-12-28 LAB
ALBUMIN SERPL BCG-MCNC: 3.9 G/DL (ref 3.5–5)
ALP SERPL-CCNC: 77 U/L (ref 34–104)
ALT SERPL W P-5'-P-CCNC: 54 U/L (ref 7–52)
ANION GAP SERPL CALCULATED.3IONS-SCNC: 6 MMOL/L (ref 4–13)
AST SERPL W P-5'-P-CCNC: 30 U/L (ref 13–39)
BASOPHILS # BLD AUTO: 0.05 THOUSANDS/ÂΜL (ref 0–0.1)
BASOPHILS NFR BLD AUTO: 0 % (ref 0–1)
BILIRUB SERPL-MCNC: 0.65 MG/DL (ref 0.2–1)
BNP SERPL-MCNC: 32 PG/ML (ref 0–100)
BUN SERPL-MCNC: 28 MG/DL (ref 5–25)
CALCIUM SERPL-MCNC: 9 MG/DL (ref 8.4–10.2)
CARDIAC TROPONIN I PNL SERPL HS: 7 NG/L (ref ?–50)
CHLORIDE SERPL-SCNC: 105 MMOL/L (ref 96–108)
CO2 SERPL-SCNC: 28 MMOL/L (ref 21–32)
CREAT SERPL-MCNC: 0.96 MG/DL (ref 0.6–1.3)
EOSINOPHIL # BLD AUTO: 0.34 THOUSAND/ÂΜL (ref 0–0.61)
EOSINOPHIL NFR BLD AUTO: 3 % (ref 0–6)
ERYTHROCYTE [DISTWIDTH] IN BLOOD BY AUTOMATED COUNT: 12.9 % (ref 11.6–15.1)
GFR SERPL CREATININE-BSD FRML MDRD: 89 ML/MIN/1.73SQ M
GLUCOSE SERPL-MCNC: 88 MG/DL (ref 65–140)
HCT VFR BLD AUTO: 49.1 % (ref 36.5–49.3)
HGB BLD-MCNC: 16.6 G/DL (ref 12–17)
IMM GRANULOCYTES # BLD AUTO: 0.16 THOUSAND/UL (ref 0–0.2)
IMM GRANULOCYTES NFR BLD AUTO: 1 % (ref 0–2)
LYMPHOCYTES # BLD AUTO: 5.1 THOUSANDS/ÂΜL (ref 0.6–4.47)
LYMPHOCYTES NFR BLD AUTO: 41 % (ref 14–44)
MCH RBC QN AUTO: 31.1 PG (ref 26.8–34.3)
MCHC RBC AUTO-ENTMCNC: 33.8 G/DL (ref 31.4–37.4)
MCV RBC AUTO: 92 FL (ref 82–98)
MONOCYTES # BLD AUTO: 0.94 THOUSAND/ÂΜL (ref 0.17–1.22)
MONOCYTES NFR BLD AUTO: 8 % (ref 4–12)
NEUTROPHILS # BLD AUTO: 6 THOUSANDS/ÂΜL (ref 1.85–7.62)
NEUTS SEG NFR BLD AUTO: 47 % (ref 43–75)
NRBC BLD AUTO-RTO: 0 /100 WBCS
PLATELET # BLD AUTO: 258 THOUSANDS/UL (ref 149–390)
PMV BLD AUTO: 9.4 FL (ref 8.9–12.7)
POTASSIUM SERPL-SCNC: 4 MMOL/L (ref 3.5–5.3)
PROT SERPL-MCNC: 7.1 G/DL (ref 6.4–8.4)
RBC # BLD AUTO: 5.33 MILLION/UL (ref 3.88–5.62)
SODIUM SERPL-SCNC: 139 MMOL/L (ref 135–147)
WBC # BLD AUTO: 12.59 THOUSAND/UL (ref 4.31–10.16)

## 2024-12-28 PROCEDURE — 71275 CT ANGIOGRAPHY CHEST: CPT

## 2024-12-28 PROCEDURE — 99285 EMERGENCY DEPT VISIT HI MDM: CPT | Performed by: EMERGENCY MEDICINE

## 2024-12-28 PROCEDURE — 85025 COMPLETE CBC W/AUTO DIFF WBC: CPT | Performed by: EMERGENCY MEDICINE

## 2024-12-28 PROCEDURE — 83880 ASSAY OF NATRIURETIC PEPTIDE: CPT | Performed by: EMERGENCY MEDICINE

## 2024-12-28 PROCEDURE — 80053 COMPREHEN METABOLIC PANEL: CPT | Performed by: EMERGENCY MEDICINE

## 2024-12-28 PROCEDURE — 84484 ASSAY OF TROPONIN QUANT: CPT | Performed by: EMERGENCY MEDICINE

## 2024-12-28 PROCEDURE — 36415 COLL VENOUS BLD VENIPUNCTURE: CPT | Performed by: EMERGENCY MEDICINE

## 2024-12-28 PROCEDURE — 93005 ELECTROCARDIOGRAM TRACING: CPT

## 2024-12-28 PROCEDURE — 99285 EMERGENCY DEPT VISIT HI MDM: CPT

## 2024-12-28 RX ADMIN — IOHEXOL 85 ML: 350 INJECTION, SOLUTION INTRAVENOUS at 01:48

## 2024-12-28 NOTE — Clinical Note
Sonny Mckeon was seen and treated in our emergency department on 12/28/2024.                Diagnosis:     Sonny  may return to work on return date.    He may return on this date: 12/29/2024         If you have any questions or concerns, please don't hesitate to call.      Barber Mosqueda MD    ______________________________           _______________          _______________  Hospital Representative                              Date                                Time

## 2024-12-28 NOTE — ED PROVIDER NOTES
Time reflects when diagnosis was documented in both MDM as applicable and the Disposition within this note       Time User Action Codes Description Comment    12/28/2024  2:06 AM Barber Mosqueda Add [R06.00] Dyspnea, unspecified type     12/28/2024  2:06 AM Barber Mosqueda Add [R07.9] Chest pain, unspecified type           ED Disposition       ED Disposition   Discharge    Condition   Stable    Date/Time   Sat Dec 28, 2024  2:06 AM    Comment   Sonny Mckeon discharge to home/self care.                   Assessment & Plan       Medical Decision Making  0123: Patient appears well, vital signs reviewed.  No respiratory distress.  Placed on monitor.  EKG nonischemic.  Plan to complete basic labs including cardiac enzymes, BNP.  Check CTA chest PE protocol.    0330: CT and labs reviewed.  The patient has remained stable throughout ED course.  Patient reports his symptoms made him feel like he had to wake up suddenly in the middle the night to gasp for air.  The patient has a potential for sleep apnea with his body habitus.  Encouraged close follow-up with his PCP.  Patient has not been in any distress during his stay here.    Amount and/or Complexity of Data Reviewed  External Data Reviewed: labs, radiology and notes.     Details: Chest x-ray 12/22/2024  Viral panel positive for influenza B  Labs: ordered.  Radiology: ordered.     Details: CTA chest PE protocol--no acute pathology  ECG/medicine tests: ordered and independent interpretation performed.     Details: Normal sinus rhythm 66 bpm, no acute ischemia.    Risk  Prescription drug management.             Medications   iohexol (OMNIPAQUE) 350 MG/ML injection (SINGLE-DOSE) 85 mL (85 mL Intravenous Given 12/28/24 0148)       ED Risk Strat Scores                          SBIRT 22yo+      Flowsheet Row Most Recent Value   Initial Alcohol Screen: US AUDIT-C     1. How often do you have a drink containing alcohol? 0 Filed at: 12/28/2024 0214   2. How many drinks  "containing alcohol do you have on a typical day you are drinking?  0 Filed at: 12/28/2024 0214   3a. Male UNDER 65: How often do you have five or more drinks on one occasion? 0 Filed at: 12/28/2024 0214   Audit-C Score 0 Filed at: 12/28/2024 0214   [place]: How many times in the past year have you...    Used an illegal drug or used a prescription medication for non-medical reasons? Never Filed at: 12/28/2024 0214                            History of Present Illness       Chief Complaint   Patient presents with    Shortness of Breath     C/o worsening SOB onset \"tonight\", pt reports recently being tx for \"Flu and Pneumonia\". Currently taking prescriptions. Pt reports \"I woke myself up 3 times because I felt like I couldn't breathe\". Denies fall/trauma association.       Past Medical History:   Diagnosis Date    Arthritis     Hypertension       History reviewed. No pertinent surgical history.   History reviewed. No pertinent family history.   Social History     Tobacco Use    Smoking status: Never    Smokeless tobacco: Never   Vaping Use    Vaping status: Never Used   Substance Use Topics    Alcohol use: Not Currently    Drug use: Not Currently      E-Cigarette/Vaping    E-Cigarette Use Never User       E-Cigarette/Vaping Substances      I have reviewed and agree with the history as documented.       History provided by:  Medical records and patient  Shortness of Breath  Severity:  Moderate  Onset quality:  Gradual  Duration:  6 hours  Timing:  Intermittent  Progression:  Waxing and waning  Chronicity:  New  Context: URI    Context comment:  Patient with cough, recent diagnosis of influenza.  Patient states he laid down last night has had some chest tightness and shortness of breath mainly with laying flat  Relieved by:  Nothing  Worsened by:  Nothing  Ineffective treatments:  None tried  Associated symptoms: cough    Associated symptoms: no abdominal pain, no chest pain, no claudication, no diaphoresis, no ear pain, no " fever, no headaches, no hemoptysis, no neck pain, no PND, no rash, no sore throat, no sputum production, no syncope, no swollen glands, no vomiting and no wheezing    Risk factors: no hx of PE/DVT        Review of Systems   Constitutional:  Negative for appetite change, chills, diaphoresis, fatigue and fever.   HENT:  Negative for ear pain, rhinorrhea, sore throat and trouble swallowing.    Eyes:  Negative for pain, discharge and visual disturbance.   Respiratory:  Positive for cough, chest tightness and shortness of breath. Negative for hemoptysis, sputum production and wheezing.    Cardiovascular:  Negative for chest pain, palpitations, claudication, syncope and PND.   Gastrointestinal:  Negative for abdominal pain, nausea and vomiting.   Endocrine: Negative for polydipsia, polyphagia and polyuria.   Genitourinary:  Negative for difficulty urinating, dysuria, hematuria and testicular pain.   Musculoskeletal:  Negative for arthralgias, back pain and neck pain.   Skin:  Negative for color change and rash.   Allergic/Immunologic: Negative for immunocompromised state.   Neurological:  Negative for dizziness, seizures, syncope, weakness and headaches.   Hematological:  Negative for adenopathy.   Psychiatric/Behavioral:  Negative for confusion and dysphoric mood.    All other systems reviewed and are negative.          Objective       ED Triage Vitals [12/28/24 0121]   Temperature Pulse Blood Pressure Respirations SpO2 Patient Position - Orthostatic VS   97.6 °F (36.4 °C) 66 (!) 177/103 20 99 % Lying      Temp Source Heart Rate Source BP Location FiO2 (%) Pain Score    Temporal Monitor Right arm -- No Pain      Vitals      Date and Time Temp Pulse SpO2 Resp BP Pain Score FACES Pain Rating User   12/28/24 0330 -- 67 96 % 20 137/81 -- --    12/28/24 0315 -- 63 96 % 18 140/79 -- --    12/28/24 0300 -- 65 96 % 19 135/76 -- --    12/28/24 0230 -- 67 96 % 18 143/85 -- --    12/28/24 0215 -- 66 96 % 19 151/81 -- --     12/28/24 0121 97.6 °F (36.4 °C) 66 99 % 20 177/103 No Pain -- KO            Physical Exam  Vitals and nursing note reviewed.   Constitutional:       General: He is not in acute distress.     Appearance: Normal appearance. He is well-developed. He is obese. He is not ill-appearing, toxic-appearing or diaphoretic.   HENT:      Head: Normocephalic and atraumatic.      Nose: Nose normal. No congestion or rhinorrhea.      Mouth/Throat:      Mouth: Mucous membranes are moist.      Pharynx: Oropharynx is clear. No oropharyngeal exudate or posterior oropharyngeal erythema.   Eyes:      General:         Right eye: No discharge.         Left eye: No discharge.   Cardiovascular:      Rate and Rhythm: Normal rate and regular rhythm.      Pulses: Normal pulses.      Heart sounds: Normal heart sounds. No murmur heard.     No gallop.   Pulmonary:      Effort: Pulmonary effort is normal. No respiratory distress.      Breath sounds: Normal breath sounds. No stridor. No wheezing, rhonchi or rales.   Chest:      Chest wall: No tenderness.   Abdominal:      General: Bowel sounds are normal. There is no distension.      Palpations: Abdomen is soft. There is no mass.      Tenderness: There is no abdominal tenderness. There is no right CVA tenderness, left CVA tenderness, guarding or rebound.      Hernia: No hernia is present.   Musculoskeletal:         General: Normal range of motion.      Cervical back: Normal range of motion and neck supple.      Right lower leg: Edema present.      Left lower leg: Edema present.      Comments: +1 nonpitting edema bilateral lower extremities   Skin:     General: Skin is warm and dry.      Capillary Refill: Capillary refill takes less than 2 seconds.   Neurological:      General: No focal deficit present.      Mental Status: He is alert and oriented to person, place, and time.      Cranial Nerves: No cranial nerve deficit.      Sensory: No sensory deficit.      Motor: No weakness.      Coordination:  Coordination normal.      Gait: Gait normal.      Deep Tendon Reflexes: Reflexes normal.   Psychiatric:         Mood and Affect: Mood normal.         Behavior: Behavior normal.         Thought Content: Thought content normal.         Judgment: Judgment normal.         Results Reviewed       Procedure Component Value Units Date/Time    HS Troponin 0hr (reflex protocol) [[id number]]  (Normal) Collected: 12/28/24 0134    Lab Status: Final result Specimen: Blood from Arm, Left Updated: 12/28/24 0214     hs TnI 0hr 7 ng/L     B-Type Natriuretic Peptide(BNP) [[id number]]  (Normal) Collected: 12/28/24 0134    Lab Status: Final result Specimen: Blood from Arm, Left Updated: 12/28/24 0213     BNP 32 pg/mL     Comprehensive metabolic panel [[id number]]  (Abnormal) Collected: 12/28/24 0134    Lab Status: Final result Specimen: Blood from Arm, Left Updated: 12/28/24 0206     Sodium 139 mmol/L      Potassium 4.0 mmol/L      Chloride 105 mmol/L      CO2 28 mmol/L      ANION GAP 6 mmol/L      BUN 28 mg/dL      Creatinine 0.96 mg/dL      Glucose 88 mg/dL      Calcium 9.0 mg/dL      AST 30 U/L      ALT 54 U/L      Alkaline Phosphatase 77 U/L      Total Protein 7.1 g/dL      Albumin 3.9 g/dL      Total Bilirubin 0.65 mg/dL      eGFR 89 ml/min/1.73sq m     Narrative:      [employer] guidelines for Chronic Kidney Disease (CKD):     Stage 1 with normal or high GFR (GFR > 90 mL/min/1.73 square meters)    Stage 2 Mild CKD (GFR = 60-89 mL/min/1.73 square meters)    Stage 3A Moderate CKD (GFR = 45-59 mL/min/1.73 square meters)    Stage 3B Moderate CKD (GFR = 30-44 mL/min/1.73 square meters)    Stage 4 Severe CKD (GFR = 15-29 mL/min/1.73 square meters)    Stage 5 End Stage CKD (GFR <15 mL/min/1.73 square meters)  Note: GFR calculation is accurate only with a steady state creatinine    CBC and differential [[id number]]  (Abnormal) Collected: 12/28/24 0134    Lab Status: Final result Specimen: Blood from Arm, Left Updated:  12/28/24 0142     WBC 12.59 Thousand/uL      RBC 5.33 Million/uL      Hemoglobin 16.6 g/dL      Hematocrit 49.1 %      MCV 92 fL      MCH 31.1 pg      MCHC 33.8 g/dL      RDW 12.9 %      MPV 9.4 fL      Platelets 258 Thousands/uL      nRBC 0 /100 WBCs      Segmented % 47 %      Immature Grans % 1 %      Lymphocytes % 41 %      Monocytes % 8 %      Eosinophils Relative 3 %      Basophils Relative 0 %      Absolute Neutrophils 6.00 Thousands/µL      Absolute Immature Grans 0.16 Thousand/uL      Absolute Lymphocytes 5.10 Thousands/µL      Absolute Monocytes 0.94 Thousand/µL      Eosinophils Absolute 0.34 Thousand/µL      Basophils Absolute 0.05 Thousands/µL             CTA chest pe study   Final Interpretation by Fantasma Rodriguez MD (12/28 0330)      1.  No pulmonary embolus. No pulmonary consolidation.   2.  Mildly prominent mediastinal and hilar lymph nodes, favored to be reactive.   3.  Ectasia of the ascending aorta. Recommendation is for follow-up low radiation dose chest CT in one year.         The study was marked in EPIC for immediate notification.            Workstation performed: [id number]             Procedures    ED Medication and Procedure Management   Prior to Admission Medications   Prescriptions Last Dose Informant Patient Reported? Taking?   doxycycline hyclate (VIBRAMYCIN) 100 mg capsule   No No   Sig: Take 1 capsule (100 mg total) by mouth every 12 (twelve) hours for 7 days   hydrochlorothiazide (HYDRODIURIL) 12.5 mg tablet   Yes No   Sig: Take 1 tablet by mouth 2 (two) times a day   losartan (COZAAR) 100 MG tablet   Yes No   Sig: Take 1 tablet by mouth daily   meloxicam (MOBIC) 7.5 mg tablet   Yes No   Sig: Take 7.5 mg by mouth daily   morphine (MSIR) 15 mg tablet   No No   Sig: Take 1 tablet (15 mg total) by mouth every 8 (eight) hours as needed for severe pain for up to 4 doses Max Daily Amount: 45 mg   oseltamivir (TAMIFLU) 75 mg capsule   No No   Sig: Take 1 capsule (75 mg total) by mouth 2 (two)  times a day for 5 days   predniSONE 20 mg tablet   No No   Sig: Take 2 tablets (40 mg total) by mouth daily for 4 days Do not start before December 24, 2024.      Facility-Administered Medications: None     Discharge Medication List as of 12/28/2024  2:27 AM        CONTINUE these medications which have NOT CHANGED    Details   doxycycline hyclate (VIBRAMYCIN) 100 mg capsule Take 1 capsule (100 mg total) by mouth every 12 (twelve) hours for 7 days, Starting Mon 12/23/2024, Until Mon 12/30/2024, Normal      hydrochlorothiazide (HYDRODIURIL) 12.5 mg tablet Take 1 tablet by mouth 2 (two) times a day, Starting Mon 7/18/2022, Historical Med      losartan (COZAAR) 100 MG tablet Take 1 tablet by mouth daily, Starting Mon 7/25/2022, Historical Med      meloxicam (MOBIC) 7.5 mg tablet Take 7.5 mg by mouth daily, Starting Fri 7/29/2022, Until Sat 7/29/2023, Historical Med      morphine (MSIR) 15 mg tablet Take 1 tablet (15 mg total) by mouth every 8 (eight) hours as needed for severe pain for up to 4 doses Max Daily Amount: 45 mg, Starting Sat 7/30/2022, Normal      oseltamivir (TAMIFLU) 75 mg capsule Take 1 capsule (75 mg total) by mouth 2 (two) times a day for 5 days, Starting Mon 12/23/2024, Until Sat 12/28/2024, Normal      predniSONE 20 mg tablet Take 2 tablets (40 mg total) by mouth daily for 4 days Do not start before December 24, 2024., Starting Tue 12/24/2024, Until Sat 12/28/2024, Normal           No discharge procedures on file.  ED SEPSIS DOCUMENTATION   Time reflects when diagnosis was documented in both MDM as applicable and the Disposition within this note       Time User Action Codes Description Comment    12/28/2024  2:06 AM Barber Mosqueda [R06.00] Dyspnea, unspecified type     12/28/2024  2:06 AM Barber Mosqueda [R07.9] Chest pain, unspecified type                  Barber Mosqueda MD  12/28/24 2480

## 2024-12-29 LAB
ATRIAL RATE: 66 BPM
P AXIS: 52 DEGREES
PR INTERVAL: 120 MS
QRS AXIS: 37 DEGREES
QRSD INTERVAL: 90 MS
QT INTERVAL: 388 MS
QTC INTERVAL: 406 MS
T WAVE AXIS: 43 DEGREES
VENTRICULAR RATE: 66 BPM

## 2024-12-29 PROCEDURE — 93010 ELECTROCARDIOGRAM REPORT: CPT | Performed by: INTERNAL MEDICINE

## 2025-01-09 ENCOUNTER — HOSPITAL ENCOUNTER (EMERGENCY)
Facility: HOSPITAL | Age: 55
Discharge: HOME | End: 2025-01-09
Attending: EMERGENCY MEDICINE | Admitting: EMERGENCY MEDICINE
Payer: COMMERCIAL

## 2025-01-09 VITALS
BODY MASS INDEX: 47.87 KG/M2 | OXYGEN SATURATION: 94 % | RESPIRATION RATE: 18 BRPM | HEIGHT: 67 IN | HEART RATE: 88 BPM | TEMPERATURE: 97.7 F | SYSTOLIC BLOOD PRESSURE: 159 MMHG | DIASTOLIC BLOOD PRESSURE: 92 MMHG | WEIGHT: 305 LBS

## 2025-01-09 DIAGNOSIS — F41.9 ANXIETY: Primary | ICD-10-CM

## 2025-01-09 DIAGNOSIS — F41.0 PANIC ATTACK: ICD-10-CM

## 2025-01-09 PROCEDURE — 99284 EMERGENCY DEPT VISIT MOD MDM: CPT | Performed by: EMERGENCY MEDICINE

## 2025-01-09 PROCEDURE — 99284 EMERGENCY DEPT VISIT MOD MDM: CPT

## 2025-01-09 RX ORDER — LORAZEPAM 0.5 MG/1
0.5 TABLET ORAL 3 TIMES DAILY PRN
Qty: 15 TABLET | Refills: 0 | Status: SHIPPED | OUTPATIENT
Start: 2025-01-09 | End: 2025-01-19

## 2025-01-09 NOTE — ED PROVIDER NOTES
Time reflects when diagnosis was documented in both MDM as applicable and the Disposition within this note       Time User Action Codes Description Comment    1/9/2025  2:35 AM Michael Daugherty Add [F41.9] Anxiety     1/9/2025  2:35 AM Michael Daughetry Add [F41.0] Panic attack           ED Disposition       ED Disposition   Discharge    Condition   Stable    Date/Time   Thu Jan 9, 2025  2:35 AM    Comment   Sonny Mckeon discharge to home/self care.                   Assessment & Plan       Medical Decision Making  Differential diagnosis include but not limited to pneumonia anxiety attack upper respiratory infection CHF.  The patient is afebrile nontoxic well-appearing clinically and hemodynamically stable in the emergency department recent workup reviewed no evidence of acute decompensated congestive heart failure present no evidence of active pneumonia at this time symptoms strongly consistent with acute anxiety for now patient is feeling much improved patient requesting something to take for any further similar anxiety attacks in the future we will prescribe short course of Ativan to take as needed discussed precautions with this advised rest and supportive care and prompt follow-up with primary physician for further evaluation and treatment.  return precautions and anticipatory guidance discussed.      Risk  Prescription drug management.             Medications - No data to display    ED Risk Strat Scores                                              History of Present Illness       Chief Complaint   Patient presents with    Shortness of Breath     SOB since 12/22 and was seen here 2 other times for the same. Pt struggles to breathe when laying flat. Denies any other symptoms.       Past Medical History:   Diagnosis Date    Arthritis     Hypertension       History reviewed. No pertinent surgical history.   History reviewed. No pertinent family history.   Social History     Tobacco Use    Smoking status: Never     Smokeless tobacco: Never   Vaping Use    Vaping status: Never Used   Substance Use Topics    Alcohol use: Not Currently    Drug use: Not Currently      E-Cigarette/Vaping    E-Cigarette Use Never User       E-Cigarette/Vaping Substances      I have reviewed and agree with the history as documented.     Patient is a 54-year-old male recently diagnosed with pneumonia history of arthritis and hypertension presents emergency department due to shortness of breath which onset acutely this evening when he woke from sleep patient reports he felt like he could not breathe and then got himself very worked up emotionally when he went outside and walked and took his mind off the symptoms entirely resolved patient believes his symptoms were related to anxiety no chest pain no fevers or chills no cough patient denies any acute shortness of breath at this time feels well now and has been sleeping in the waiting room in the ER and feeling improved after calming down and getting his mind off his breathing.        History provided by:  Patient  Shortness of Breath  Associated symptoms: no abdominal pain, no chest pain, no cough, no fever, no vomiting and no wheezing        Review of Systems   Constitutional:  Negative for fever.   HENT:  Negative for congestion.    Respiratory:  Positive for shortness of breath. Negative for cough and wheezing.    Cardiovascular:  Negative for chest pain.   Gastrointestinal:  Negative for abdominal pain, diarrhea, nausea and vomiting.   Neurological:  Negative for light-headedness.   Psychiatric/Behavioral:  The patient is nervous/anxious.    All other systems reviewed and are negative.          Objective       ED Triage Vitals [01/09/25 0036]   Temperature Pulse Blood Pressure Respirations SpO2 Patient Position - Orthostatic VS   97.7 °F (36.5 °C) 88 159/92 18 94 % Sitting      Temp src Heart Rate Source BP Location FiO2 (%) Pain Score    -- Monitor Left arm -- --      Vitals      Date and Time Temp  Pulse SpO2 Resp BP Pain Score FACES Pain Rating User   01/09/25 0036 97.7 °F (36.5 °C) 88 94 % 18 159/92 -- -- RG            Physical Exam  Vitals and nursing note reviewed.   Constitutional:       General: He is not in acute distress.     Appearance: Normal appearance.   HENT:      Head: Normocephalic and atraumatic.      Nose: Nose normal.   Eyes:      Conjunctiva/sclera: Conjunctivae normal.   Cardiovascular:      Rate and Rhythm: Normal rate and regular rhythm.   Pulmonary:      Effort: Pulmonary effort is normal. No respiratory distress.      Breath sounds: Normal breath sounds.   Skin:     General: Skin is dry.   Neurological:      General: No focal deficit present.      Mental Status: He is alert and oriented to person, place, and time.         Results Reviewed       None            No orders to display       Procedures    ED Medication and Procedure Management   Prior to Admission Medications   Prescriptions Last Dose Informant Patient Reported? Taking?   hydrochlorothiazide (HYDRODIURIL) 12.5 mg tablet   Yes No   Sig: Take 1 tablet by mouth 2 (two) times a day   losartan (COZAAR) 100 MG tablet   Yes No   Sig: Take 1 tablet by mouth daily   meloxicam (MOBIC) 7.5 mg tablet   Yes No   Sig: Take 7.5 mg by mouth daily   morphine (MSIR) 15 mg tablet   No No   Sig: Take 1 tablet (15 mg total) by mouth every 8 (eight) hours as needed for severe pain for up to 4 doses Max Daily Amount: 45 mg      Facility-Administered Medications: None     Patient's Medications   Discharge Prescriptions    LORAZEPAM (ATIVAN) 0.5 MG TABLET    Take 1 tablet (0.5 mg total) by mouth 3 (three) times a day as needed for anxiety for up to 10 days       Start Date: 1/9/2025  End Date: 1/19/2025       Order Dose: 0.5 mg       Quantity: 15 tablet    Refills: 0     No discharge procedures on file.  ED SEPSIS DOCUMENTATION   Time reflects when diagnosis was documented in both MDM as applicable and the Disposition within this note       Time  User Action Codes Description Comment    1/9/2025  2:35 AM Michael Daugherty Add [F41.9] Anxiety     1/9/2025  2:35 AM Michael Daugherty Add [F41.0] Panic attack                  Michael Daugherty,   01/09/25 0239

## 2025-01-09 NOTE — ED NOTES
Pt seen, assessed, treated and d/c'd by provider, independent of nursing care.       Henry Bundy RN  01/09/25 4945

## 2025-02-04 ENCOUNTER — APPOINTMENT (EMERGENCY)
Dept: RADIOLOGY | Facility: HOSPITAL | Age: 55
End: 2025-02-04
Payer: COMMERCIAL

## 2025-02-04 ENCOUNTER — HOSPITAL ENCOUNTER (EMERGENCY)
Facility: HOSPITAL | Age: 55
Discharge: HOME | End: 2025-02-04
Attending: EMERGENCY MEDICINE | Admitting: EMERGENCY MEDICINE
Payer: COMMERCIAL

## 2025-02-04 VITALS
HEART RATE: 73 BPM | SYSTOLIC BLOOD PRESSURE: 175 MMHG | RESPIRATION RATE: 18 BRPM | DIASTOLIC BLOOD PRESSURE: 107 MMHG | TEMPERATURE: 97.5 F | OXYGEN SATURATION: 97 %

## 2025-02-04 DIAGNOSIS — J40 BRONCHITIS: Primary | ICD-10-CM

## 2025-02-04 LAB
FLUAV AG UPPER RESP QL IA.RAPID: NEGATIVE
FLUBV AG UPPER RESP QL IA.RAPID: NEGATIVE
SARS-COV+SARS-COV-2 AG RESP QL IA.RAPID: NEGATIVE

## 2025-02-04 PROCEDURE — 87811 SARS-COV-2 COVID19 W/OPTIC: CPT | Performed by: EMERGENCY MEDICINE

## 2025-02-04 PROCEDURE — 99284 EMERGENCY DEPT VISIT MOD MDM: CPT | Performed by: EMERGENCY MEDICINE

## 2025-02-04 PROCEDURE — 99283 EMERGENCY DEPT VISIT LOW MDM: CPT

## 2025-02-04 PROCEDURE — 71046 X-RAY EXAM CHEST 2 VIEWS: CPT

## 2025-02-04 PROCEDURE — 87804 INFLUENZA ASSAY W/OPTIC: CPT | Performed by: EMERGENCY MEDICINE

## 2025-02-04 RX ORDER — CEFUROXIME AXETIL 500 MG/1
500 TABLET ORAL EVERY 12 HOURS SCHEDULED
Qty: 20 TABLET | Refills: 0 | Status: SHIPPED | OUTPATIENT
Start: 2025-02-04 | End: 2025-02-14

## 2025-02-04 RX ORDER — ALBUTEROL SULFATE 90 UG/1
2 INHALANT RESPIRATORY (INHALATION) EVERY 6 HOURS PRN
Qty: 6.7 G | Refills: 0 | Status: SHIPPED | OUTPATIENT
Start: 2025-02-04

## 2025-02-04 RX ORDER — CEFUROXIME AXETIL 250 MG/1
500 TABLET ORAL ONCE
Status: COMPLETED | OUTPATIENT
Start: 2025-02-04 | End: 2025-02-04

## 2025-02-04 RX ORDER — METHYLPREDNISOLONE 4 MG/1
TABLET ORAL
Qty: 21 TABLET | Refills: 0 | Status: SHIPPED | OUTPATIENT
Start: 2025-02-04

## 2025-02-04 RX ADMIN — CEFUROXIME AXETIL 500 MG: 250 TABLET ORAL at 15:28

## 2025-02-04 NOTE — ED PROVIDER NOTES
Time reflects when diagnosis was documented in both MDM as applicable and the Disposition within this note       Time User Action Codes Description Comment    2/4/2025  3:21 PM Jhonyn Haywood Add [J40] Bronchitis           ED Disposition       ED Disposition   Discharge    Condition   Stable    Date/Time   Tue Feb 4, 2025  3:21 PM    Comment   Sonny Mckeon discharge to home/self care.                   Assessment & Plan       Medical Decision Making  Amount and/or Complexity of Data Reviewed  Labs: ordered. Decision-making details documented in ED Course.  Radiology: ordered and independent interpretation performed. Decision-making details documented in ED Course.  Discussion of management or test interpretation with external provider(s): At risk for but not limited to COVID, flu, RSV, bronchitis, pneumonia    Risk  Prescription drug management.             Medications   cefuroxime (CEFTIN) tablet 500 mg (500 mg Oral Given 2/4/25 1528)       ED Risk Strat Scores                                              History of Present Illness       Chief Complaint   Patient presents with    Cough     Cough, congestion x1wk went to pcp Friday. Covid/flu negative at that time. Had a chest xray yesterday and was told it was normal.        Past Medical History:   Diagnosis Date    Arthritis     Hypertension       History reviewed. No pertinent surgical history.   History reviewed. No pertinent family history.   Social History     Tobacco Use    Smoking status: Never    Smokeless tobacco: Never   Vaping Use    Vaping status: Never Used   Substance Use Topics    Alcohol use: Not Currently    Drug use: Not Currently      E-Cigarette/Vaping    E-Cigarette Use Never User       E-Cigarette/Vaping Substances      I have reviewed and agree with the history as documented.     Patient been sick for the past 4 days.  Congested and coughing.  Occasional whitish phlegm.  Does not smoke.  Seen by PCP and states that his COVID and flu  test were negative.  Chest x-ray is normal.  Was placed on prednisone and a medicine to help with the cough.  Not any better.  Called his PCP who told to come the emergency department.  Feeling feverish.  Having sweats.  And chills.  No vomiting or diarrhea.      History provided by:  Patient   used: No    Cough  Cough characteristics:  Productive  Sputum characteristics:  White  Severity:  Mild  Onset quality:  Gradual  Duration:  4 days  Timing:  Constant  Progression:  Worsening  Smoker: no    Context: upper respiratory infection    Relieved by:  Nothing  Worsened by:  Nothing  Ineffective treatments:  None tried  Associated symptoms: chills, fever and rhinorrhea    Associated symptoms: no chest pain, no ear fullness, no ear pain, no eye discharge, no headaches, no rash, no shortness of breath, no sore throat, no weight loss and no wheezing        Review of Systems   Constitutional:  Positive for chills and fever. Negative for weight loss.   HENT:  Positive for rhinorrhea. Negative for ear pain, hearing loss, sore throat, trouble swallowing and voice change.    Eyes:  Negative for pain and discharge.   Respiratory:  Positive for cough. Negative for shortness of breath and wheezing.    Cardiovascular:  Negative for chest pain and palpitations.   Gastrointestinal:  Negative for abdominal pain, blood in stool, constipation, diarrhea, nausea and vomiting.   Genitourinary:  Negative for dysuria, flank pain, frequency and hematuria.   Musculoskeletal:  Negative for joint swelling, neck pain and neck stiffness.   Skin:  Negative for rash and wound.   Neurological:  Negative for dizziness, seizures, syncope, facial asymmetry and headaches.   Psychiatric/Behavioral:  Negative for hallucinations, self-injury and suicidal ideas.    All other systems reviewed and are negative.          Objective       ED Triage Vitals [02/04/25 1452]   Temperature Pulse Blood Pressure Respirations SpO2 Patient Position -  Orthostatic VS   97.5 °F (36.4 °C) 73 (!) 175/107 18 97 % --      Temp Source Heart Rate Source BP Location FiO2 (%) Pain Score    Temporal Monitor Left arm -- --      Vitals      Date and Time Temp Pulse SpO2 Resp BP Pain Score FACES Pain Rating User   02/04/25 1452 97.5 °F (36.4 °C) 73 97 % 18 175/107 -- -- SS            Physical Exam  Vitals and nursing note reviewed.   Constitutional:       General: He is not in acute distress.     Appearance: He is well-developed.   HENT:      Head: Normocephalic and atraumatic.      Right Ear: External ear normal.      Left Ear: External ear normal.   Eyes:      General: No scleral icterus.        Right eye: No discharge.         Left eye: No discharge.      Extraocular Movements: Extraocular movements intact.      Conjunctiva/sclera: Conjunctivae normal.   Cardiovascular:      Rate and Rhythm: Normal rate and regular rhythm.      Heart sounds: Normal heart sounds. No murmur heard.  Pulmonary:      Effort: Pulmonary effort is normal.      Breath sounds: Normal breath sounds. No wheezing or rales.   Abdominal:      General: Bowel sounds are normal. There is no distension.      Palpations: Abdomen is soft.      Tenderness: There is no abdominal tenderness. There is no guarding or rebound.   Musculoskeletal:         General: No deformity. Normal range of motion.      Cervical back: Normal range of motion and neck supple.   Skin:     General: Skin is warm and dry.      Findings: No rash.   Neurological:      General: No focal deficit present.      Mental Status: He is alert and oriented to person, place, and time.      Cranial Nerves: No cranial nerve deficit.   Psychiatric:         Mood and Affect: Mood normal.         Behavior: Behavior normal.         Thought Content: Thought content normal.         Judgment: Judgment normal.         Results Reviewed       Procedure Component Value Units Date/Time    FLU/COVID Rapid Antigen (30 min. TAT) - Preferred screening test in ED  [[id number]]  (Normal) Collected: 02/04/25 1527    Lab Status: Final result Specimen: Nares from Nose Updated: 02/04/25 1548     SARS COV Rapid Antigen Negative     Influenza A Rapid Antigen Negative     Influenza B Rapid Antigen Negative    Narrative:      This test has been performed using the Quidel Miriam 2 FLU+SARS Antigen test under the Emergency Use Authorization (EUA). This test has been validated by the  and verified by the performing laboratory. The Miriam uses lateral flow immunofluorescent sandwich assay to detect SARS-COV, Influenza A and Influenza B Antigen.     The Quidel Miriam 2 SARS Antigen test does not differentiate between SARS-CoV and SARS-CoV-2.     Negative results are presumptive and may be confirmed with a molecular assay, if necessary, for patient management. Negative results do not rule out SARS-CoV-2 or influenza infection and should not be used as the sole basis for treatment or patient management decisions. A negative test result may occur if the level of antigen in a sample is below the limit of detection of this test.     Positive results are indicative of the presence of viral antigens, but do not rule out bacterial infection or co-infection with other viruses.     All test results should be used as an adjunct to clinical observations and other information available to the provider.    FOR PEDIATRIC PATIENTS - copy/paste COVID Guidelines URL to browser: [email or web address]            XR chest pa and lateral   ED Interpretation by Jhonny Haywood MD (02/04 1517)   NAD          Procedures    ED Medication and Procedure Management   Prior to Admission Medications   Prescriptions Last Dose Informant Patient Reported? Taking?   LORazepam (Ativan) 0.5 mg tablet   No No   Sig: Take 1 tablet (0.5 mg total) by mouth 3 (three) times a day as needed for anxiety for up to 10 days   hydrochlorothiazide (HYDRODIURIL) 12.5 mg tablet    Yes No   Sig: Take 1 tablet by mouth 2 (two) times a day   losartan (COZAAR) 100 MG tablet   Yes No   Sig: Take 1 tablet by mouth daily   meloxicam (MOBIC) 7.5 mg tablet   Yes No   Sig: Take 7.5 mg by mouth daily   morphine (MSIR) 15 mg tablet   No No   Sig: Take 1 tablet (15 mg total) by mouth every 8 (eight) hours as needed for severe pain for up to 4 doses Max Daily Amount: 45 mg      Facility-Administered Medications: None     Patient's Medications   Discharge Prescriptions    ALBUTEROL (PROVENTIL HFA) 90 MCG/ACT INHALER    Inhale 2 puffs every 6 (six) hours as needed for wheezing       Start Date: 2/4/2025  End Date: --       Order Dose: 2 puffs       Quantity: 6.7 g    Refills: 0    CEFUROXIME (CEFTIN) 500 MG TABLET    Take 1 tablet (500 mg total) by mouth every 12 (twelve) hours for 10 days       Start Date: 2/4/2025  End Date: 2/14/2025       Order Dose: 500 mg       Quantity: 20 tablet    Refills: 0    METHYLPREDNISOLONE 4 MG TABLET THERAPY PACK    Use as directed on package       Start Date: 2/4/2025  End Date: --       Order Dose: --       Quantity: 21 tablet    Refills: 0     No discharge procedures on file.  ED SEPSIS DOCUMENTATION   Time reflects when diagnosis was documented in both MDM as applicable and the Disposition within this note       Time User Action Codes Description Comment    2/4/2025  3:21 PM Jhonny Haywood Add [J40] Bronchitis                  Jhonny Haywood MD  02/04/25 1522       Jhonny Haywood MD  02/04/25 1551